# Patient Record
Sex: FEMALE | Race: WHITE | URBAN - METROPOLITAN AREA
[De-identification: names, ages, dates, MRNs, and addresses within clinical notes are randomized per-mention and may not be internally consistent; named-entity substitution may affect disease eponyms.]

---

## 2019-10-31 ENCOUNTER — TELEPHONE (OUTPATIENT)
Dept: SURGERY | Facility: CLINIC | Age: 69
End: 2019-10-31

## 2019-10-31 NOTE — TELEPHONE ENCOUNTER
Patient is 9[age]5 years (5/7/10) s/p LAGB with Dr. Smith.  Patient's last noted follow-up in [facility] was 12/7/10 with Dr. Smith in which all fluid was removed from her band and she was to start the [facility] for further support.  Attempted to contact patient regarding post-op status since last follow-up in clinic.  Phone number listed is no longer in service, unable to contact patient at this time.